# Patient Record
Sex: MALE | Race: WHITE | NOT HISPANIC OR LATINO | Employment: FULL TIME | URBAN - METROPOLITAN AREA
[De-identification: names, ages, dates, MRNs, and addresses within clinical notes are randomized per-mention and may not be internally consistent; named-entity substitution may affect disease eponyms.]

---

## 2019-04-22 ENCOUNTER — HOSPITAL ENCOUNTER (EMERGENCY)
Facility: HOSPITAL | Age: 29
Discharge: HOME | End: 2019-04-22
Attending: EMERGENCY MEDICINE
Payer: COMMERCIAL

## 2019-04-22 VITALS
SYSTOLIC BLOOD PRESSURE: 145 MMHG | TEMPERATURE: 98 F | OXYGEN SATURATION: 98 % | DIASTOLIC BLOOD PRESSURE: 94 MMHG | RESPIRATION RATE: 16 BRPM | WEIGHT: 179.38 LBS | HEART RATE: 85 BPM

## 2019-04-22 DIAGNOSIS — H60.92 OTITIS EXTERNA OF LEFT EAR, UNSPECIFIED CHRONICITY, UNSPECIFIED TYPE: ICD-10-CM

## 2019-04-22 DIAGNOSIS — R11.0 NAUSEA: ICD-10-CM

## 2019-04-22 DIAGNOSIS — J06.9 UPPER RESPIRATORY TRACT INFECTION, UNSPECIFIED TYPE: Primary | ICD-10-CM

## 2019-04-22 PROCEDURE — 25000003 PHARM REV CODE 250: Mod: ER | Performed by: EMERGENCY MEDICINE

## 2019-04-22 PROCEDURE — 99284 EMERGENCY DEPT VISIT MOD MDM: CPT | Mod: ER

## 2019-04-22 RX ORDER — ONDANSETRON 4 MG/1
4 TABLET, ORALLY DISINTEGRATING ORAL
Status: COMPLETED | OUTPATIENT
Start: 2019-04-22 | End: 2019-04-22

## 2019-04-22 RX ORDER — DEXTROAMPHETAMINE SACCHARATE, AMPHETAMINE ASPARTATE MONOHYDRATE, DEXTROAMPHETAMINE SULFATE AND AMPHETAMINE SULFATE 5; 5; 5; 5 MG/1; MG/1; MG/1; MG/1
20 CAPSULE, EXTENDED RELEASE ORAL 2 TIMES DAILY
COMMUNITY

## 2019-04-22 RX ORDER — ONDANSETRON 4 MG/1
4 TABLET, FILM COATED ORAL EVERY 6 HOURS
Qty: 12 TABLET | Refills: 0 | Status: SHIPPED | OUTPATIENT
Start: 2019-04-22

## 2019-04-22 RX ORDER — NEOMYCIN SULFATE, POLYMYXIN B SULFATE AND HYDROCORTISONE 10; 3.5; 1 MG/ML; MG/ML; [USP'U]/ML
4 SUSPENSION/ DROPS AURICULAR (OTIC) 4 TIMES DAILY
Qty: 10 ML | Refills: 0 | Status: SHIPPED | OUTPATIENT
Start: 2019-04-22 | End: 2019-05-02

## 2019-04-22 RX ADMIN — ONDANSETRON 4 MG: 4 TABLET, ORALLY DISINTEGRATING ORAL at 08:04

## 2019-04-22 NOTE — ED PROVIDER NOTES
Encounter Date: 4/22/2019       History     Chief Complaint   Patient presents with    Fatigue     c/o weakness x 3 days    Otalgia     left earache since yesterday     The history is provided by the patient.   URI   The primary symptoms include fatigue, sore throat and myalgias. Primary symptoms do not include fever, headaches, ear pain, swollen glands, cough, wheezing, abdominal pain, nausea, vomiting, arthralgias or rash. The current episode started several days ago (4 days ago). This is a new problem. The problem has been gradually worsening.   The fatigue began 3 to 5 days ago. The fatigue has been unchanged since its onset.   The sore throat began 2 days ago. The sore throat has been unchanged since its onset. The sore throat is not accompanied by trouble swallowing, drooling, hoarse voice or stridor. Sore Throat Pain Scale: mild.   Myalgias began 3 to 5 days ago. The myalgias are generalized. The myalgias are aching. The myalgias are not associated with weakness, tenderness or swelling. Myalgias Pain Scale: mild.   The onset of the illness is associated with exposure to sick contacts (coworkers with similar symptoms). The illness is not associated with chills, plugged ear sensation, facial pain, sinus pressure, congestion or rhinorrhea. Associated symptoms comments: External ear pain on left  . The following treatments were addressed: Acetaminophen was not tried. A decongestant was not tried. Aspirin was not tried. NSAIDs were not tried.     Review of patient's allergies indicates:  No Known Allergies  Past Medical History:   Diagnosis Date    ADHD      History reviewed. No pertinent surgical history.  History reviewed. No pertinent family history.  Social History     Tobacco Use    Smoking status: Never Smoker    Smokeless tobacco: Never Used   Substance Use Topics    Alcohol use: Not Currently     Frequency: Never    Drug use: Never     Review of Systems   Constitutional: Positive for fatigue.  Negative for chills and fever.   HENT: Positive for sore throat. Negative for congestion, drooling, ear pain, hoarse voice, rhinorrhea, sinus pressure and trouble swallowing.    Respiratory: Negative for cough, shortness of breath, wheezing and stridor.    Cardiovascular: Negative for chest pain.   Gastrointestinal: Negative for abdominal pain, nausea and vomiting.   Genitourinary: Negative for dysuria.   Musculoskeletal: Positive for myalgias. Negative for arthralgias and back pain.   Skin: Negative for rash.   Neurological: Negative for weakness and headaches.   Hematological: Does not bruise/bleed easily.   All other systems reviewed and are negative.      Physical Exam     Initial Vitals [04/22/19 0759]   BP Pulse Resp Temp SpO2   (!) 145/94 85 16 98.1 °F (36.7 °C) 98 %      MAP       --         Physical Exam    Nursing note and vitals reviewed.  Constitutional: He appears well-developed and well-nourished. He is not diaphoretic. No distress.   HENT:   Head: Normocephalic and atraumatic.   Right Ear: Hearing, tympanic membrane, external ear and ear canal normal.   Left Ear: Hearing, tympanic membrane, external ear and ear canal normal. There is tenderness (erythema of external canal on left). No drainage or swelling. No decreased hearing is noted.   Nose: Nose normal. Right sinus exhibits no maxillary sinus tenderness and no frontal sinus tenderness. Left sinus exhibits no maxillary sinus tenderness and no frontal sinus tenderness.   Mouth/Throat: Uvula is midline and mucous membranes are normal. Posterior oropharyngeal erythema present. No oropharyngeal exudate or posterior oropharyngeal edema.   Eyes: EOM are normal. Pupils are equal, round, and reactive to light. No scleral icterus.   Neck: Normal range of motion. Neck supple. No thyromegaly present.   Cardiovascular: Normal rate, regular rhythm, normal heart sounds and intact distal pulses. Exam reveals no gallop and no friction rub.    No murmur  heard.  Pulmonary/Chest: Breath sounds normal. No respiratory distress. He has no wheezes. He has no rhonchi. He exhibits no tenderness.   Abdominal: Soft. Bowel sounds are normal. He exhibits no distension. There is no tenderness. There is no rebound and no guarding.   Musculoskeletal: Normal range of motion. He exhibits no edema or tenderness.   Lymphadenopathy:     He has no cervical adenopathy.   Neurological: He is alert and oriented to person, place, and time. He has normal strength. No cranial nerve deficit or sensory deficit.   Skin: Skin is warm and dry.   Psychiatric: He has a normal mood and affect. His behavior is normal. Judgment and thought content normal.         ED Course   Procedures  Labs Reviewed - No data to display       Imaging Results    None                Vitals:    04/22/19 0759   BP: (!) 145/94   Pulse: 85   Resp: 16   Temp: 98.1 °F (36.7 °C)   TempSrc: Oral   SpO2: 98%   Weight: 81.4 kg (179 lb 5.5 oz)       No results found for this or any previous visit.      Imaging Results    None         Medications   ondansetron disintegrating tablet 4 mg (4 mg Oral Given 4/22/19 0813)       8:13 AM - Re-evaluation: The patient is resting comfortably and is in no acute distress. He states that his symptoms have improved after treatment within ER. Discussed test results, shared treatment plan, specific conditions for return, and importance of follow up with patient and family.  He understands and agrees with the plan as discussed. Answered  his questions at this time. He has remained hemodynamically stable throughout the ED course and is appropriate for discharge home.     Regarding UPPER RESPIRATORY ILLNESS, for treatment, I encouraged patient to: drink plenty of fluids; get lots of rest; take medications as prescribed; use over-the-counter medications for symptomatic treatment;  and use a humidifier or steam in the bathroom to improve patency of upper airway.  Patient instructed to notify primary  care provider, or return to the emergency department, if the they: have a cough most days or have a cough that returns frequently; begin coughing up blood; develop a high fever or shaking chills; have a low-grade fever for three or more days; develop thick, greenish mucus, especially if it has a bad smell; and experience shortness of breath or chest pain. For prevention, discussed with patient the importance of refraining from smoking (if applicable), getting annual influenza vaccines, reducing exposure to air pollution, and the need to frequently wash hands to avoid spread of infection.     Regarding OTITIS EXTERNA, I recommended the use of ibuprofen and/or acetaminophen for management of pain or fever and the use of heat (utilizing a warm, moist washcloth on the ear to decrease pain for 15-20 minutes every 4 hours as needed) and/or ice (to help decrease swelling and pain utilizing an ice pack applied to the ear for 15-20 minutes every 4 hours as needed) to decrease pain.  Apply medications as directed.  Avoid swimming and keep ears completely dry for 3-4 weeks.  Reiterated the importance of following up with primary care provider, especially if the pain gets worse or persist even after treatment; ear is tender or swollen; develop nausea, vomiting, or diarrhea; notice fluid draining from ear; hearing loss develops; there is any change in mental or neurologic status; or have any questions regarding the management and treatment of otitis externa.  Also discussed importance of returning to the emergency department for febrile seizures, intractable fever, stiff neck, or difficulty breathing.     For NAUSEA/VOMITING, I advised patient on importance of staying well hydrated; eating frequent, small amounts of clear liquids; avoid solid foods until there has been no vomiting for six hours, and then work slowly back to a normal diet; and to use an OTC bismuth stomach remedy for upset stomach, nausea, indigestion, and  diarrhea. We discussed signs and symptoms of dehydration and possible causes of N/V with patient (viral infections, medications, migraine headaches, food poisoning, allergies, and peptic ulcer disease).  Reiterated the importance of following up with primary care provider if no improvement is noted within 72 hours.     Pre-hypertension/Hypertension: The pt has been informed that they may have pre-hypertension or hypertension based on a blood pressure reading in the ED. I recommend that the pt call the PCP listed on their discharge instructions or a physician of their choice this week to arrange f/u for further evaluation of possible pre-hypertension or hypertension.     Dread Lawler was given a handout which discussed their disease process, precautions, and instructions for follow-up and therapy.    Follow-up Information     Trace Louis MD. Schedule an appointment as soon as possible for a visit in 1 week.    Specialty:  Family Medicine  Contact information:  [address] [address] [address]  [phone]             [facility].    Specialty:  Emergency Medicine  Why:  As needed, If symptoms worsen  Contact information:  [address] [address] [address] [phone]                    Medication List      START taking these medications    neomycin-polymyxin-hydrocortisone 3.5-10,000-1 mg/mL-unit/mL-% otic suspension  Commonly known as:  CORTISPORIN  Place 4 drops into the left ear 4 (four) times daily. for 10 days     ondansetron 4 MG tablet  Commonly known as:  ZOFRAN  Take 1 tablet (4 mg total) by mouth every 6 (six) hours.        ASK your doctor about these medications    dextroamphetamine-amphetamine 20 MG 24 hr capsule  Commonly known as:  ADDERALL XR           Where to Get Your Medications      You can get these medications from any pharmacy    Bring a paper prescription for each of these medications  · neomycin-polymyxin-hydrocortisone 3.5-10,000-1  mg/mL-unit/mL-% otic suspension  · ondansetron 4 MG tablet        Current Discharge Medication List      START taking these medications    Details   neomycin-polymyxin-hydrocortisone (CORTISPORIN) 3.5-10,000-1 mg/mL-unit/mL-% otic suspension Place 4 drops into the left ear 4 (four) times daily. for 10 days  Qty: 10 mL, Refills: 0      ondansetron (ZOFRAN) 4 MG tablet Take 1 tablet (4 mg total) by mouth every 6 (six) hours.  Qty: 12 tablet, Refills: 0               ED Diagnosis  1. Upper respiratory tract infection, unspecified type    2. Otitis externa of left ear, unspecified chronicity, unspecified type    3. Nausea                          Clinical Impression:       ICD-10-CM ICD-9-CM   1. Upper respiratory tract infection, unspecified type J06.9 465.9   2. Otitis externa of left ear, unspecified chronicity, unspecified type H60.92 380.10   3. Nausea R11.0 787.02         Disposition:   Disposition: Discharged  Condition: Stable                        Kolby Muñoz Jr., MD  04/22/19 0818

## 2019-04-22 NOTE — DISCHARGE INSTRUCTIONS
Rest  Drink plenty of clear fluids   Nasal saline spray to clear nasal drainage and help with nasal congestion  Zyrtec or Claritin to help dry mucus and post nasal drip  Mucinex or Mucinex DM for cough and chest congestion  Tylenol or Ibuprofen for fever, headache and body aches  Warm salt water gargles for throat comfort  Chloraseptic spray or lozenges for throat comfort  RTC with no improvement or worsening    Regarding UPPER RESPIRATORY ILLNESS, for treatment, I encouraged patient to: drink plenty of fluids; get lots of rest; take medications as prescribed; use over-the-counter medications for symptomatic treatment;  and use a humidifier or steam in the bathroom to improve patency of upper airway.  Patient instructed to notify primary care provider, or return to the emergency department, if the they: have a cough most days or have a cough that returns frequently; begin coughing up blood; develop a high fever or shaking chills; have a low-grade fever for three or more days; develop thick, greenish mucus, especially if it has a bad smell; and experience shortness of breath or chest pain. For prevention, discussed with patient the importance of refraining from smoking (if applicable), getting annual influenza vaccines, reducing exposure to air pollution, and the need to frequently wash hands to avoid spread of infection.     Regarding OTITIS EXTERNA, I recommended the use of ibuprofen and/or acetaminophen for management of pain or fever and the use of heat (utilizing a warm, moist washcloth on the ear to decrease pain for 15-20 minutes every 4 hours as needed) and/or ice (to help decrease swelling and pain utilizing an ice pack applied to the ear for 15-20 minutes every 4 hours as needed) to decrease pain.  Apply medications as directed.  Avoid swimming and keep ears completely dry for 3-4 weeks.  Reiterated the importance of following up with primary care provider, especially if the pain gets worse or persist even  after treatment; ear is tender or swollen; develop nausea, vomiting, or diarrhea; notice fluid draining from ear; hearing loss develops; there is any change in mental or neurologic status; or have any questions regarding the management and treatment of otitis externa.  Also discussed importance of returning to the emergency department for febrile seizures, intractable fever, stiff neck, or difficulty breathing.